# Patient Record
Sex: MALE | Race: WHITE | URBAN - METROPOLITAN AREA
[De-identification: names, ages, dates, MRNs, and addresses within clinical notes are randomized per-mention and may not be internally consistent; named-entity substitution may affect disease eponyms.]

---

## 2017-08-23 ENCOUNTER — APPOINTMENT (RX ONLY)
Dept: URBAN - METROPOLITAN AREA CLINIC 51 | Facility: CLINIC | Age: 82
Setting detail: DERMATOLOGY
End: 2017-08-23

## 2017-08-23 DIAGNOSIS — L24 IRRITANT CONTACT DERMATITIS: ICD-10-CM

## 2017-08-23 PROBLEM — L24.9 IRRITANT CONTACT DERMATITIS, UNSPECIFIED CAUSE: Status: ACTIVE | Noted: 2017-08-23

## 2017-08-23 PROBLEM — J44.9 CHRONIC OBSTRUCTIVE PULMONARY DISEASE, UNSPECIFIED: Status: ACTIVE | Noted: 2017-08-23

## 2017-08-23 PROBLEM — F32.9 MAJOR DEPRESSIVE DISORDER, SINGLE EPISODE, UNSPECIFIED: Status: ACTIVE | Noted: 2017-08-23

## 2017-08-23 PROCEDURE — ? PRESCRIPTION

## 2017-08-23 PROCEDURE — 99202 OFFICE O/P NEW SF 15 MIN: CPT

## 2017-08-23 PROCEDURE — ? TREATMENT REGIMEN

## 2017-08-23 PROCEDURE — ? COUNSELING

## 2017-08-23 RX ORDER — PREDNISONE 10 MG/1
TABLET ORAL
Qty: 18 | Refills: 0 | Status: ERX | COMMUNITY
Start: 2017-08-23

## 2017-08-23 RX ORDER — TRIAMCINOLONE ACETONIDE 1 MG/G
CREAM TOPICAL
Qty: 1 | Refills: 1 | Status: ERX | COMMUNITY
Start: 2017-08-23

## 2017-08-23 RX ORDER — CEPHALEXIN 500 MG/1
CAPSULE ORAL
Qty: 20 | Refills: 0 | Status: ERX | COMMUNITY
Start: 2017-08-23

## 2017-08-23 RX ADMIN — PREDNISONE: 10 TABLET ORAL at 00:00

## 2017-08-23 RX ADMIN — TRIAMCINOLONE ACETONIDE: 1 CREAM TOPICAL at 00:00

## 2017-08-23 RX ADMIN — CEPHALEXIN: 500 CAPSULE ORAL at 00:00

## 2017-08-23 ASSESSMENT — SEVERITY ASSESSMENT: SEVERITY: MILD TO MODERATE

## 2017-08-23 ASSESSMENT — PAIN INTENSITY VAS: HOW INTENSE IS YOUR PAIN 0 BEING NO PAIN, 10 BEING THE MOST SEVERE PAIN POSSIBLE?: NO PAIN

## 2017-08-23 NOTE — PROCEDURE: TREATMENT REGIMEN
Otc Regimen: Dove unscented sensitive skin\\nCeraVe moisturizer
Plan: Needs to keep shower at a luke-warm temperature, not hot and not cold. Patient also has very dry skin. Went over patient's medications, to which none are new in the past few months. Patient also has a superficial infection on abdomen, to which Keflex will be prescribed.
Initiate Treatment: Prednisone\\nTriamcinolone\\nKeflex
Detail Level: Zone

## 2017-09-13 ENCOUNTER — APPOINTMENT (RX ONLY)
Dept: URBAN - METROPOLITAN AREA CLINIC 51 | Facility: CLINIC | Age: 82
Setting detail: DERMATOLOGY
End: 2017-09-13

## 2017-09-13 DIAGNOSIS — R23.3 SPONTANEOUS ECCHYMOSES: ICD-10-CM

## 2017-09-13 DIAGNOSIS — L24 IRRITANT CONTACT DERMATITIS: ICD-10-CM

## 2017-09-13 PROBLEM — Z85.828 PERSONAL HISTORY OF OTHER MALIGNANT NEOPLASM OF SKIN: Status: ACTIVE | Noted: 2017-09-13

## 2017-09-13 PROBLEM — L24.9 IRRITANT CONTACT DERMATITIS, UNSPECIFIED CAUSE: Status: ACTIVE | Noted: 2017-09-13

## 2017-09-13 PROCEDURE — ? PRESCRIPTION

## 2017-09-13 PROCEDURE — ? COUNSELING

## 2017-09-13 PROCEDURE — ? TREATMENT REGIMEN

## 2017-09-13 PROCEDURE — 99213 OFFICE O/P EST LOW 20 MIN: CPT

## 2017-09-13 RX ORDER — TRIAMCINOLONE ACETONIDE 1 MG/G
CREAM TOPICAL
Qty: 1 | Refills: 1 | Status: ERX

## 2017-09-13 ASSESSMENT — BSA RASH: BSA RASH: 10

## 2017-09-13 ASSESSMENT — SEVERITY ASSESSMENT: SEVERITY: MILD TO MODERATE

## 2017-09-13 ASSESSMENT — PAIN INTENSITY VAS: HOW INTENSE IS YOUR PAIN 0 BEING NO PAIN, 10 BEING THE MOST SEVERE PAIN POSSIBLE?: NO PAIN

## 2017-09-13 ASSESSMENT — LOCATION ZONE DERM: LOCATION ZONE: ARM

## 2017-09-13 ASSESSMENT — LOCATION SIMPLE DESCRIPTION DERM: LOCATION SIMPLE: LEFT FOREARM

## 2017-09-13 ASSESSMENT — LOCATION DETAILED DESCRIPTION DERM: LOCATION DETAILED: LEFT DISTAL DORSAL FOREARM

## 2017-09-13 NOTE — PROCEDURE: TREATMENT REGIMEN
Initiate Treatment: Triamcinolone
Detail Level: Zone
Otc Regimen: CeraVe moisturizer
Otc Regimen: Dermend

## 2017-11-02 ENCOUNTER — APPOINTMENT (RX ONLY)
Dept: URBAN - METROPOLITAN AREA CLINIC 51 | Facility: CLINIC | Age: 82
Setting detail: DERMATOLOGY
End: 2017-11-02

## 2017-11-02 DIAGNOSIS — R21 RASH AND OTHER NONSPECIFIC SKIN ERUPTION: ICD-10-CM

## 2017-11-02 PROCEDURE — ? TREATMENT REGIMEN

## 2017-11-02 PROCEDURE — ? BIOPSY BY PUNCH METHOD

## 2017-11-02 PROCEDURE — ? PRESCRIPTION

## 2017-11-02 PROCEDURE — 11100: CPT

## 2017-11-02 PROCEDURE — ? ADDITIONAL NOTES

## 2017-11-02 PROCEDURE — 99214 OFFICE O/P EST MOD 30 MIN: CPT | Mod: 25

## 2017-11-02 PROCEDURE — ? SEPARATE AND IDENTIFIABLE DOCUMENTATION

## 2017-11-02 RX ORDER — TRIAMCINOLONE ACETONIDE 1 MG/G
CREAM TOPICAL BID
Qty: 1 | Refills: 1 | Status: ERX

## 2017-11-02 ASSESSMENT — LOCATION DETAILED DESCRIPTION DERM: LOCATION DETAILED: RIGHT BUTTOCK

## 2017-11-02 ASSESSMENT — LOCATION SIMPLE DESCRIPTION DERM: LOCATION SIMPLE: RIGHT BUTTOCK

## 2017-11-02 ASSESSMENT — LOCATION ZONE DERM: LOCATION ZONE: TRUNK

## 2017-11-02 NOTE — PROCEDURE: BIOPSY BY PUNCH METHOD
Biopsy Type: H and E
Post-Care Instructions: I reviewed with the patient in detail post-care instructions. Patient is to keep the biopsy site dry overnight, and then apply vaseline once daily for 2 weeks. Patient may apply hydrogen peroxide soaks to remove any crusting.
Billing Type: Third-Party Bill
Render Post-Care Instructions In Note?: yes
Notification Instructions: Patient will RTC in 1 week to have sutures removed and review bx results.
Detail Level: Simple
Hemostasis: Pressure
Dressing: pressure dressing
Suture Removal: 7 days
Consent: Verbal consent was obtained and risks were reviewed including but not limited to scarring, infection, bleeding, scabbing, incomplete removal, nerve damage and allergy to anesthesia.
X Depth Of Punch In Cm (Optional): 0
Lab: [id number]
Anesthesia Volume In Cc (Will Not Render If 0): 1
Punch Size In Mm: 4
Epidermal Sutures: 4-0 Nylon
Lab Facility: [id number]
Bill For Surgical Tray: no
Anesthesia Type: 1% lidocaine without epinephrine
Wound Care: Aquaphor

## 2017-11-02 NOTE — HPI: SKIN LESIONS
Is This A New Presentation, Or A Follow-Up?: Skin Lesions
How Severe Is Your Skin Lesion?: moderate
Have Your Skin Lesions Been Treated?: not been treated
Additional History: Patient states he has had these sores all over his body and they come and go, has been happening for several months.

## 2017-11-02 NOTE — PROCEDURE: ADDITIONAL NOTES
Additional Notes: Will rule out CTCL.  Differential also includes chronic eczema, ITP, and the plaques on the buttocks LSC.  Scabies and autoimmune blistering disorder are considered but less likely.  He is on multiple medications which could be causing his prurititis as well.

## 2017-11-15 ENCOUNTER — APPOINTMENT (RX ONLY)
Dept: URBAN - METROPOLITAN AREA CLINIC 51 | Facility: CLINIC | Age: 82
Setting detail: DERMATOLOGY
End: 2017-11-15

## 2017-11-15 DIAGNOSIS — L72.0 EPIDERMAL CYST: ICD-10-CM

## 2017-11-15 DIAGNOSIS — L20.89 OTHER ATOPIC DERMATITIS: ICD-10-CM

## 2017-11-15 PROBLEM — L57.0 ACTINIC KERATOSIS: Status: ACTIVE | Noted: 2017-11-15

## 2017-11-15 PROBLEM — L20.84 INTRINSIC (ALLERGIC) ECZEMA: Status: ACTIVE | Noted: 2017-11-15

## 2017-11-15 PROCEDURE — ? ADDITIONAL NOTES

## 2017-11-15 PROCEDURE — 99214 OFFICE O/P EST MOD 30 MIN: CPT

## 2017-11-15 PROCEDURE — ? COUNSELING

## 2017-11-15 ASSESSMENT — LOCATION ZONE DERM
LOCATION ZONE: ARM
LOCATION ZONE: TRUNK
LOCATION ZONE: ARM

## 2017-11-15 ASSESSMENT — LOCATION DETAILED DESCRIPTION DERM
LOCATION DETAILED: LEFT SUPERIOR MEDIAL UPPER BACK
LOCATION DETAILED: RIGHT MEDIAL SUPERIOR CHEST
LOCATION DETAILED: RIGHT PROXIMAL DORSAL FOREARM
LOCATION DETAILED: LEFT PROXIMAL DORSAL FOREARM
LOCATION DETAILED: RIGHT ANTERIOR LATERAL DISTAL UPPER ARM

## 2017-11-15 ASSESSMENT — LOCATION SIMPLE DESCRIPTION DERM
LOCATION SIMPLE: LEFT FOREARM
LOCATION SIMPLE: CHEST
LOCATION SIMPLE: RIGHT FOREARM
LOCATION SIMPLE: LEFT UPPER BACK
LOCATION SIMPLE: RIGHT UPPER ARM

## 2017-11-15 NOTE — PROCEDURE: ADDITIONAL NOTES
Additional Notes: At this point I think referral to a tertiary medical center might be appropriate.  If pt not willing to try UVB again I am not sure what options we may have.  MTX was not helpful, he declines UVB, and with his hx of cancer and other hematologic disease I do not think he is a good candidate for cyclosporine, cell cept, or immunosuppressive therapy.  I will contact his hematologist to discuss options.

## 2019-08-09 ENCOUNTER — APPOINTMENT (RX ONLY)
Dept: URBAN - METROPOLITAN AREA CLINIC 51 | Facility: CLINIC | Age: 84
Setting detail: DERMATOLOGY
End: 2019-08-09

## 2019-08-09 DIAGNOSIS — L08.9 LOCAL INFECTION OF THE SKIN AND SUBCUTANEOUS TISSUE, UNSPECIFIED: ICD-10-CM

## 2019-08-09 DIAGNOSIS — L82.1 OTHER SEBORRHEIC KERATOSIS: ICD-10-CM

## 2019-08-09 PROBLEM — D48.5 NEOPLASM OF UNCERTAIN BEHAVIOR OF SKIN: Status: ACTIVE | Noted: 2019-08-09

## 2019-08-09 PROCEDURE — 69100 BIOPSY OF EXTERNAL EAR: CPT

## 2019-08-09 PROCEDURE — ? BIOPSY BY SHAVE METHOD

## 2019-08-09 PROCEDURE — ? ADDITIONAL NOTES

## 2019-08-09 PROCEDURE — ? COUNSELING

## 2019-08-09 PROCEDURE — 11102 TANGNTL BX SKIN SINGLE LES: CPT | Mod: 59

## 2019-08-09 PROCEDURE — 99212 OFFICE O/P EST SF 10 MIN: CPT | Mod: 25

## 2019-08-09 PROCEDURE — ? ORDER TESTS

## 2019-08-09 ASSESSMENT — LOCATION ZONE DERM
LOCATION ZONE: LEG
LOCATION ZONE: FACE

## 2019-08-09 ASSESSMENT — LOCATION DETAILED DESCRIPTION DERM
LOCATION DETAILED: RIGHT DISTAL PRETIBIAL REGION
LOCATION DETAILED: LEFT CENTRAL TEMPLE

## 2019-08-09 ASSESSMENT — LOCATION SIMPLE DESCRIPTION DERM
LOCATION SIMPLE: LEFT TEMPLE
LOCATION SIMPLE: RIGHT PRETIBIAL REGION

## 2019-08-09 NOTE — PROCEDURE: ADDITIONAL NOTES
Additional Notes: elected to repeat the bx and culture the wound.  There is some drainage and a hint of foul odor.  There is also significant pain associated with the lesion and I would like to exclude infection and SCC with a repeat bx.  The rapid rate of growth and tenderness are not characteristic of BCC and I would like to better define the lesion before scheduling additional surgery.
Detail Level: Simple

## 2019-08-09 NOTE — PROCEDURE: ORDER TESTS
Lab Facility: [id number]
Performing Laboratory: [id number]
Billing Type: Third-Party Bill
Bill For Surgical Tray: no
Expected Date Of Service: 08/09/2019

## 2019-08-09 NOTE — HPI: SKIN LESION
Is This A New Presentation, Or A Follow-Up?: Skin Lesion
Has Your Skin Lesion Been Treated?: not been treated
Additional History: Patient had biopsy and pathology report shows bcc, he is here to discuss treatment options

## 2019-08-09 NOTE — PROCEDURE: BIOPSY BY SHAVE METHOD
Wound Care: Aquaphor
Depth Of Biopsy: dermis
Size Of Lesion In Cm: 1.2
Anesthesia Type: 1% lidocaine with epinephrine
Notification Instructions: Patient will be notified of biopsy results. However, patient instructed to call the office if not contacted within 2 weeks.
Additional Anesthesia Volume In Cc (Will Not Render If 0): 0
Bill For Surgical Tray: no
Path Notes (To The Dermatopathologist): 12 mm
Render Post-Care Instructions In Note?: yes
Type Of Destruction Used: Electrodesiccation
Anesthesia Volume In Cc (Will Not Render If 0): 1
Biopsy Type: H and E
Consent: Verbal consent was obtained and risks were reviewed including but not limited to scarring, infection, bleeding, scabbing, incomplete removal, nerve damage and allergy to anesthesia.
Billing Type: Third-Party Bill
Hemostasis: Electrocautery
Detail Level: Detailed
Biopsy Method: Steven rice
Dressing: Band-Aid
Post-Care Instructions: I reviewed with the patient in detail post-care instructions. Patient is to keep the biopsy site dry overnight, and then apply vaseline daily until healed. Patient may apply hydrogen peroxide soaks to remove any crusting.
Lab: [id number]
Billing Type: Third-Party Bill
Size Of Lesion In Cm: 2.5
Lab: [id number]
Path Notes (To The Dermatopathologist): 2.5 cm

## 2019-11-14 ENCOUNTER — APPOINTMENT (RX ONLY)
Dept: URBAN - METROPOLITAN AREA CLINIC 51 | Facility: CLINIC | Age: 84
Setting detail: DERMATOLOGY
End: 2019-11-14

## 2019-11-14 DIAGNOSIS — D485 NEOPLASM OF UNCERTAIN BEHAVIOR OF SKIN: ICD-10-CM

## 2019-11-14 PROBLEM — D48.5 NEOPLASM OF UNCERTAIN BEHAVIOR OF SKIN: Status: ACTIVE | Noted: 2019-11-14

## 2019-11-14 PROCEDURE — 17281 DSTR MAL LS F/E/E/N/L/M .6-1: CPT | Mod: 76

## 2019-11-14 PROCEDURE — ? SHAVE REMOVAL AND DESTRUCTION

## 2019-11-14 PROCEDURE — 17281 DSTR MAL LS F/E/E/N/L/M .6-1: CPT

## 2019-11-14 ASSESSMENT — LOCATION SIMPLE DESCRIPTION DERM
LOCATION SIMPLE: RIGHT EAR
LOCATION SIMPLE: LEFT EAR

## 2019-11-14 ASSESSMENT — LOCATION ZONE DERM: LOCATION ZONE: EAR

## 2019-11-14 ASSESSMENT — LOCATION DETAILED DESCRIPTION DERM
LOCATION DETAILED: LEFT SUPERIOR POSTERIOR HELIX
LOCATION DETAILED: RIGHT ANTIHELIX

## 2019-11-14 NOTE — PROCEDURE: SHAVE REMOVAL AND DESTRUCTION
Size After Destruction (Required For Destruction Billing): 0.7
Render Post-Care Instructions In Note?: yes
Wound Care: Aquaphor
Cautery Type: electrodesiccation
Bill [id number] For Specimen Handling/Conveyance To Laboratory?: no
Size Of Lesion In Cm: 0.6
Dressing: Band-Aid
Anesthesia Volume In Cc: 0
Anesthesia Type: 1% lidocaine with epinephrine
Number Of Curettages: 3
Billing Type: Third-Party Bill
Lab: [id number]
Post-Care Instructions: I reviewed with the patient in detail post-care instructions. Patient is to keep the biopsy site dry overnight, and then apply vaseline daily until healed. Patient may apply hydrogen peroxide soaks to remove any crusting.
Lab Facility: [id number]
Detail Level: Detailed
Hemostasis: Electrocautery
Notification Instructions: Patient will be notified of biopsy results. However, patient instructed to call the office if not contacted within 2 weeks.
Path Notes (To The Dermatopathologist): 6 mm
Consent: Verbal consent was obtained and risks were reviewed including but not limited to scarring, infection, bleeding, scabbing, incomplete removal, nerve damage and allergy to anesthesia.
Bill As?: Note: Bill Malignant Destruction If Path Confirms Malignant Lesion. Only Bill As Shave Removal If Path Comes Back Benign. Do Not Bill Shave Removal On Malignant Lesions.: Malignant Destruction
Anesthesia Volume In Cc: 0.5
Lab Facility: [id number]
Path Notes (To The Dermatopathologist): 7 mm
Size After Destruction (Required For Destruction Billing): 0.8
Billing Type: Third-Party Bill
Lab: [id number]